# Patient Record
Sex: MALE
[De-identification: names, ages, dates, MRNs, and addresses within clinical notes are randomized per-mention and may not be internally consistent; named-entity substitution may affect disease eponyms.]

---

## 2022-11-07 PROBLEM — Z00.00 ENCOUNTER FOR PREVENTIVE HEALTH EXAMINATION: Status: ACTIVE | Noted: 2022-11-07

## 2022-11-21 ENCOUNTER — NON-APPOINTMENT (OUTPATIENT)
Age: 87
End: 2022-11-21

## 2022-11-22 ENCOUNTER — APPOINTMENT (OUTPATIENT)
Dept: HEMATOLOGY ONCOLOGY | Facility: CLINIC | Age: 87
End: 2022-11-22

## 2022-11-22 VITALS
BODY MASS INDEX: 28.01 KG/M2 | HEIGHT: 67 IN | RESPIRATION RATE: 18 BRPM | SYSTOLIC BLOOD PRESSURE: 180 MMHG | WEIGHT: 178.44 LBS | HEART RATE: 75 BPM | DIASTOLIC BLOOD PRESSURE: 82 MMHG | OXYGEN SATURATION: 98 % | TEMPERATURE: 98.1 F

## 2022-11-22 DIAGNOSIS — Z87.891 PERSONAL HISTORY OF NICOTINE DEPENDENCE: ICD-10-CM

## 2022-11-22 DIAGNOSIS — C34.90 MALIGNANT NEOPLASM OF UNSPECIFIED PART OF UNSPECIFIED BRONCHUS OR LUNG: ICD-10-CM

## 2022-11-22 DIAGNOSIS — G45.8 OTHER TRANSIENT CEREBRAL ISCHEMIC ATTACKS AND RELATED SYNDROMES: ICD-10-CM

## 2022-11-22 DIAGNOSIS — M77.9 ENTHESOPATHY, UNSPECIFIED: ICD-10-CM

## 2022-11-22 DIAGNOSIS — Z80.0 FAMILY HISTORY OF MALIGNANT NEOPLASM OF DIGESTIVE ORGANS: ICD-10-CM

## 2022-11-22 DIAGNOSIS — Z78.9 OTHER SPECIFIED HEALTH STATUS: ICD-10-CM

## 2022-11-22 DIAGNOSIS — I10 ESSENTIAL (PRIMARY) HYPERTENSION: ICD-10-CM

## 2022-11-22 DIAGNOSIS — D63.8 ANEMIA IN OTHER CHRONIC DISEASES CLASSIFIED ELSEWHERE: ICD-10-CM

## 2022-11-22 DIAGNOSIS — Z80.3 FAMILY HISTORY OF MALIGNANT NEOPLASM OF BREAST: ICD-10-CM

## 2022-11-22 DIAGNOSIS — I26.99 OTHER PULMONARY EMBOLISM W/OUT ACUTE COR PULMONALE: ICD-10-CM

## 2022-11-22 DIAGNOSIS — Z87.438 PERSONAL HISTORY OF OTHER DISEASES OF MALE GENITAL ORGANS: ICD-10-CM

## 2022-11-22 PROCEDURE — 36415 COLL VENOUS BLD VENIPUNCTURE: CPT

## 2022-11-22 PROCEDURE — 99205 OFFICE O/P NEW HI 60 MIN: CPT | Mod: 25

## 2022-11-22 RX ORDER — SUCRALFATE 1 G/10ML
1 SUSPENSION ORAL
Qty: 420 | Refills: 0 | Status: ACTIVE | COMMUNITY
Start: 2022-10-17

## 2022-11-22 RX ORDER — IPRATROPIUM BROMIDE 42 UG/1
0.06 SPRAY NASAL
Qty: 45 | Refills: 0 | Status: ACTIVE | COMMUNITY
Start: 2022-10-20

## 2022-11-22 RX ORDER — TAMSULOSIN HYDROCHLORIDE 0.4 MG/1
0.4 CAPSULE ORAL
Qty: 90 | Refills: 0 | Status: ACTIVE | COMMUNITY
Start: 2022-09-23

## 2022-11-22 RX ORDER — ENOXAPARIN SODIUM 80 MG/.8ML
80 INJECTION, SOLUTION SUBCUTANEOUS
Qty: 3 | Refills: 0 | Status: DISCONTINUED | COMMUNITY
Start: 2022-09-14 | End: 2022-11-22

## 2022-11-22 RX ORDER — APIXABAN 5 MG/1
5 TABLET, FILM COATED ORAL
Qty: 180 | Refills: 0 | Status: ACTIVE | COMMUNITY
Start: 2022-09-26

## 2022-11-22 RX ORDER — ROSUVASTATIN CALCIUM 10 MG/1
10 TABLET, FILM COATED ORAL
Qty: 90 | Refills: 0 | Status: ACTIVE | COMMUNITY
Start: 2022-10-20

## 2022-11-22 RX ORDER — OXYCODONE AND ACETAMINOPHEN 5; 325 MG/1; MG/1
5-325 TABLET ORAL
Qty: 8 | Refills: 0 | Status: ACTIVE | COMMUNITY
Start: 2022-08-02

## 2022-11-22 RX ORDER — LOSARTAN POTASSIUM 50 MG/1
50 TABLET, FILM COATED ORAL
Qty: 90 | Refills: 0 | Status: ACTIVE | COMMUNITY
Start: 2022-09-13

## 2022-11-22 RX ORDER — AMLODIPINE BESYLATE 5 MG/1
5 TABLET ORAL
Qty: 180 | Refills: 0 | Status: ACTIVE | COMMUNITY
Start: 2022-11-07

## 2022-11-22 RX ORDER — ALBUTEROL SULFATE 90 UG/1
108 (90 BASE) INHALANT RESPIRATORY (INHALATION)
Qty: 18 | Refills: 0 | Status: ACTIVE | COMMUNITY
Start: 2022-09-09

## 2022-11-22 RX ORDER — ISOSORBIDE MONONITRATE 30 MG/1
30 TABLET, EXTENDED RELEASE ORAL
Qty: 90 | Refills: 0 | Status: ACTIVE | COMMUNITY
Start: 2022-10-20

## 2022-11-22 RX ORDER — METOPROLOL SUCCINATE 50 MG/1
50 TABLET, EXTENDED RELEASE ORAL
Qty: 90 | Refills: 0 | Status: ACTIVE | COMMUNITY
Start: 2022-10-20

## 2022-11-22 RX ORDER — LOTEPREDNOL ETABONATE 5 MG/ML
0.5 SUSPENSION/ DROPS OPHTHALMIC
Qty: 5 | Refills: 0 | Status: ACTIVE | COMMUNITY
Start: 2022-07-21

## 2022-11-22 RX ORDER — FLUTICASONE PROPIONATE 110 UG/1
110 AEROSOL, METERED RESPIRATORY (INHALATION)
Qty: 12 | Refills: 0 | Status: ACTIVE | COMMUNITY
Start: 2022-08-29

## 2022-11-22 RX ORDER — NAPROXEN 500 MG/1
500 TABLET ORAL
Qty: 15 | Refills: 1 | Status: ACTIVE | COMMUNITY
Start: 2022-11-22 | End: 1900-01-01

## 2022-11-22 NOTE — ASSESSMENT
[FreeTextEntry1] : 87 year old male presents for evaluation of lung cancer diagnosed September 2022 after he was admitted with hemoptysis to [facility]. 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 \par I support the treatment with pembrolizumab given such a high expression of PDL-1. \par \par Guardant 360 send out.\par Reviewed with patient molecular targets for treatment for KRAS G12C and MET mutation. \par \par Right hand swelling- tendonitis, short term NSAIDs and topical diclofenac\par \par PE- Eliquis.

## 2022-11-22 NOTE — HISTORY OF PRESENT ILLNESS
[de-identified] : 87 year old male presents today for initial consultation of metastatic non small lung cancer.  Patient underwent a CTA in August 2022 ([facility]) for hemoptysis.  Impression showed a 3.2cm lobulated mass in the right hilum with thrombosis suggested in multiple segmental branches to the right upper lobe distal to the mass and associated distal airspace disease.  He was referred to Radiation oncology for lesion to T3, he is s/p 5 SBRT at [place].  \par \par MRI Brain 9/8/22 ([place])- JORGE per chart review (formerly [facility] records)\par \par PET CT ([facility]) dated 9/15/22- 3.2cm solid markedly metabolic mediastinal LN's suspicious for metastatic mediastinal lymphadenopathy.  1.3cm right interlobar hypermetabolic LN suggestive for additional metastatic lymphadenopathy.  Hypermetabolic lytic changes of the left pedicle of T3 and of the T3 vertebral body suggestive for skeletal metastasis.  \par \par MR Thoracic Spine W/and without contrast dated 10/5/22 ([facility])- Evidence of metastasis to the T3 vertebral body with epidural disease alone left aspect of the canal at this level.  There is no cord compression.  Suspect metastasis at the left superior aspect of T4.  Multilevel degenerative disc changes.  Small central disc protrusion mildly impinging of ventral subarachnoid space at T8-9 and disc bulge at T10-11.  Partially imaged degenerative disc changes in the cervical spine.  \par \par Patient underwent a Right lung Fine needle aspiration- September 6, 2022: ([place])\par A:Lymph Node, FNA\par  -Positive for malignant cells \par -Non-small cell carcinoma, favor adenocarcinoma \par \par B: Lung, Right upper lobe brushing\par -Rare atypical cells\par -Predominately benign bronchial cells and macrophages\par \par C: Lung, Right upper lobe mass, washing:\par -Negative for malignant cells \par -Few benign bronchial cells and macrophages\par \par D: Lung, Right upper lobe mass, Transbronchial FNA\par -Rare atypical cells\par -Predominately benign bronchial cells and macrophages\par \par PD-L1: High PD-L1 expression; TPS=95%\par \par Right Lung upper lobe endotracheal Biopsy, ablation and debulking of RUL tumor- 9/19/2022\par 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  Immunohistochemical staining shows the tumor to be positive for CK AE1/3, TTF-1, and focally positive for Napsin-A, while negative for p40 supporting glandular differentiation.  TPS score 75%. Negative for ROS1 and ALK rearrangement, KRAS pG12C. \par \par Patient started on Pembrolizumab 200mg q 3 weeks cycle 1 on 9/22/2022 along with Xgeva, due for # 4 tomorrow.  He developed some edema post his xgeva shot to right upper extremity, otherwise he denies any pruritus, diarrhea or SOB.  \par \par Family Hx-\par Mother diagnosed with colons cancer in her 70's\par Daughter with breast cancer 52\par Daughter with breast cancer at 44 ([place])\par \par Social Hx-\par Former smoker, quit 45 yrs ago, smoked 2 PPD x 22 yrs\par Rarely consumes ETOH\par Retired / owner [employer]

## 2022-11-22 NOTE — PHYSICAL EXAM
[Fully active, able to carry on all pre-disease performance without restriction] : Status 0 - Fully active, able to carry on all pre-disease performance without restriction [Normal] : affect appropriate [de-identified] : right hand swelling

## 2023-01-27 ENCOUNTER — NON-APPOINTMENT (OUTPATIENT)
Age: 88
End: 2023-01-27